# Patient Record
Sex: FEMALE | Race: WHITE | NOT HISPANIC OR LATINO | Employment: OTHER | URBAN - METROPOLITAN AREA
[De-identification: names, ages, dates, MRNs, and addresses within clinical notes are randomized per-mention and may not be internally consistent; named-entity substitution may affect disease eponyms.]

---

## 2018-11-30 ENCOUNTER — OFFICE VISIT (OUTPATIENT)
Dept: OPHTHALMOLOGY | Facility: CLINIC | Age: 83
End: 2018-11-30
Payer: MEDICARE

## 2018-11-30 DIAGNOSIS — Z96.1 PSEUDOPHAKIA OF BOTH EYES: ICD-10-CM

## 2018-11-30 DIAGNOSIS — H52.223 REGULAR ASTIGMATISM OF BOTH EYES: ICD-10-CM

## 2018-11-30 DIAGNOSIS — H35.3130 BILATERAL NONEXUDATIVE AGE-RELATED MACULAR DEGENERATION, UNSPECIFIED STAGE: Primary | ICD-10-CM

## 2018-11-30 PROCEDURE — 92004 COMPRE OPH EXAM NEW PT 1/>: CPT | Mod: S$GLB,,, | Performed by: OPTOMETRIST

## 2018-11-30 PROCEDURE — 99999 PR PBB SHADOW E&M-NEW PATIENT-LVL I: CPT | Mod: PBBFAC,,, | Performed by: OPTOMETRIST

## 2018-11-30 PROCEDURE — 92015 DETERMINE REFRACTIVE STATE: CPT | Mod: S$GLB,,, | Performed by: OPTOMETRIST

## 2018-11-30 NOTE — PROGRESS NOTES
HPI     PTs last exam was 2012. PT c/o blurred overall va and was wearing gls   full time, recently lost  HPI    Any vision changes since last exam: no  Eye pain: no  Other ocular symptoms: no    Do you wear currently wear glasses or contacts? gls    Interested in contacts today? no    Do you plan on getting new glasses today? If needed      Last edited by Alondra Becerra MA on 11/30/2018 10:27 AM. (History)            Assessment /Plan     For exam results, see Encounter Report.    Bilateral nonexudative age-related macular degeneration  Dry OU  No active changes today  Monitor 12 months    Pseudophakia of both eyes  Doing well  Monitor 12 months    Regular astigmatism of both eyes  Eyeglass Final Rx     Eyeglass Final Rx       Sphere Cylinder Axis Add    Right -1.25 +1.75 010 +3.25    Left [place] +0.50 110 +3.25    Expiration Date:  12/1/2019              RTC 1 yr for dilated eye exam or PRN if any problems.   Discussed above and answered questions.